# Patient Record
(demographics unavailable — no encounter records)

---

## 2025-02-20 NOTE — PHYSICAL EXAM
[General Appearance - Alert] : alert [General Appearance - In No Acute Distress] : in no acute distress [Ankle Swelling (On Exam)] : present [Ankle Swelling Bilaterally] : bilaterally  [Ankle Swelling On The Right] : mild [Delayed in the Right Toes] : capillary refills normal in right toes [Delayed in the Left Toes] : capillary refills normal in the left toes [1+] : left foot dorsalis pedis 1+ [FreeTextEntry3] : diminished pedal hair, no digital ischemia [Pes Planus] : pes planus deformity [FreeTextEntry1] : Nails 1-5 left right painful, thickened, discolored, dystrophic with subungual debris Hyperkeratotic lesion noted to Left hallux plantar medial, no underlying opening noted; mild fissuring noted without ulceration or active drainage/erythema No open ulcerations or breaks in the integument bilateral  [Diminished Throughout Right Foot] : diminished sensation with monofilament testing throughout right foot [Diminished Throughout Left Foot] : diminished sensation with monofilament testing throughout left foot [Oriented To Time, Place, And Person] : oriented to person, place, and time [Impaired Insight] : insight and judgment were intact [Affect] : the affect was normal

## 2025-02-20 NOTE — ASSESSMENT
[FreeTextEntry1] : Discussed diagnosis and treatment with patient  Discussed etiology of symptoms patient is experiencing  Aseptic debridement of nails 1-5 bilateral reducing the length with a sterile nail clipper manually and reduced girth by power vernon  Aseptic debridement of hyperkeratotic lesion Left hallux down to and including epidermal layer with sterile 15-blade.  Recommended daily foot checks and apply bandaid to left hallux daily until fissure heals Advised to monitor for any worsening change i.e. swelling, redness, drainage--if worsening advised to call for earlier eval Recommended OTC skin moisturizer daily Discussed proper shoe gear with patient  Discussed the importance of alternating shoe gear every other day  Discussed the importance of appropriate moisture balance  Patient to return to the office in 3 months

## 2025-02-20 NOTE — HISTORY OF PRESENT ILLNESS
[Sneakers] : angie [FreeTextEntry1] : OTILIA is a 60-year-old M present in the [place] office for diabetic foot exam. Patient states he was diagnosed with diabetes in 2020. Patient was referred by his pcp doctor Dr. Terrell for a diabetic foot exam. Patient was recommended not to cut his own nails due to being diabetic. Patient is complaining of a painful callus on his left foot. Patient last pcp visit was in January.   FBS- Does not check A1c- 11.3 January 2025